# Patient Record
Sex: FEMALE | Race: WHITE
[De-identification: names, ages, dates, MRNs, and addresses within clinical notes are randomized per-mention and may not be internally consistent; named-entity substitution may affect disease eponyms.]

---

## 2020-11-26 NOTE — EDM.PDOC
ED HPI GENERAL MEDICAL PROBLEM





- General


Chief Complaint: Chest Pain


Stated Complaint: CHEST TIGHTNESS


Time Seen by Provider: 11/26/20 15:53


Source of Information: Reports: Patient, RN Notes Reviewed





- History of Present Illness


INITIAL COMMENTS - FREE TEXT/NARRATIVE: 





49 yr old female with onset of central and L central chest discomfort about 3 

hrs ago.  She did take aspirin.  Still has mild to moderate chest ache and 

heaviness at time of eval. Pain does not radiate.  No abd pain, nausea or 

vomiting.  No recent cough, fever or chills.  She does not smoke. Denies hx Htn,

diabetes or other known health problems.  


  ** Anterior Chest


Pain Score (Numeric/FACES): 2





- Related Data


                                    Allergies











Allergy/AdvReac Type Severity Reaction Status Date / Time


 


Penicillins Allergy Severe Hives Verified 11/26/20 15:42











Home Meds: 


                                    Home Meds





. [No Known Home Meds]  11/26/20 [History]











Past Medical History


HEENT History: Reports: Other (See Below)


Other HEENT History: septoplasty


Musculoskeletal History: Reports: Other (See Below)


Other Musculoskeletal History: radial heads shattered bilat and had surgery


Psychiatric History: Reports: Anxiety





Social & Family History





- Tobacco Use


Tobacco Use Status *Q: Never Tobacco User





- Caffeine Use


Caffeine Use: Reports: Coffee, Soda, Tea





- Recreational Drug Use


Recreational Drug Use: No





ED ROS GENERAL





- Review of Systems


Review Of Systems: See Below


Constitutional: Denies: Fever, Chills, Diaphoresis


HEENT: Reports: No Symptoms


Respiratory: Denies: Shortness of Breath, Pleuritic Chest Pain, Cough


Cardiovascular: Reports: Chest Pain.  Denies: Palpitations


GI/Abdominal: Denies: Abdominal Pain, Nausea, Vomiting


Musculoskeletal: Denies: Shoulder Pain, Arm Pain, Back Pain


Skin: Reports: No Symptoms


Neurological: Reports: No Symptoms





ED EXAM, GENERAL





- Physical Exam


Exam: See Below


General Appearance: Alert, Anxious


Head: Atraumatic


Neck: Supple


Respiratory/Chest: No Respiratory Distress, Lungs Clear, Normal Breath Sounds, 

Chest Non-Tender


Cardiovascular: Regular Rate, Rhythm


GI/Abdominal: Soft, Non-Tender


Extremities: Normal Inspection.  No: Leg Pain, Increased Warmth, Redness


Neurological: Alert, Oriented, No Motor/Sensory Deficits


Skin Exam: Warm, Dry, Normal Color, No Rash


  ** #1 Interpretation


EKG Date: 11/26/20


Rhythm: NSR


Axis: Normal


P-Wave: Present


QRS: Other (q waves lead III)


ST-T: Other (T wave inversion lead III.)





Course





- Vital Signs


Last Recorded V/S: 


                                Last Vital Signs











Temp  97.7 F   11/26/20 15:45


 


Pulse  106 H  11/26/20 15:45


 


Resp  20   11/26/20 15:45


 


BP  153/107 H  11/26/20 15:45


 


Pulse Ox  96   11/26/20 15:45














- Orders/Labs/Meds


Labs: 


                                Laboratory Tests











  11/26/20 Range/Units





  16:40 


 


Troponin I  < 0.017  (0.00-0.056)  ng/mL














- Re-Assessments/Exams


Free Text/Narrative Re-Assessment/Exam: 





11/26/20 18:36


EKG did not show acute changes, trop nl.  Sinus rythm while here in the ED, no 

ectopy.  Discharge instr. as documented.  





Departure





- Departure


Time of Disposition: 17:35


Disposition: Home, Self-Care 01


Condition: Fair


Clinical Impression: 


 Atypical chest pain





Instructions:  Nonspecific Chest Pain, Adult


Referrals: 


My Barney MD [Primary Care Provider] - 


Forms:  ED Department Discharge


Additional Instructions: 


Rest.  You may alternate tylenol and ibuprofen as needed for discomfort.  Have 

your blood pressure checked a few times in the next 1 to 2 weeks to make sure  

that does normalize.  Follow up with your regular medical provider as needed.  

Return to ED if symptoms worsening in any way.  





Sepsis Event Note (ED)





- Evaluation


Sepsis Screening Result: No Definite Risk





- Focused Exam


Vital Signs: 


                                   Vital Signs











  Temp Pulse Resp BP Pulse Ox


 


 11/26/20 15:45  97.7 F  106 H  20  153/107 H  96

## 2023-01-24 ENCOUNTER — HOSPITAL ENCOUNTER (EMERGENCY)
Dept: HOSPITAL 41 - JD.ED | Age: 53
Discharge: HOME | End: 2023-01-24
Payer: COMMERCIAL

## 2023-01-24 DIAGNOSIS — R59.0: ICD-10-CM

## 2023-01-24 DIAGNOSIS — M54.50: Primary | ICD-10-CM

## 2023-01-24 DIAGNOSIS — Z91.048: ICD-10-CM

## 2023-01-24 DIAGNOSIS — Z88.0: ICD-10-CM

## 2023-01-24 DIAGNOSIS — Z20.822: ICD-10-CM

## 2023-01-24 DIAGNOSIS — Z91.040: ICD-10-CM

## 2023-01-24 LAB
EGFRCR SERPLBLD CKD-EPI 2021: 90 ML/MIN (ref 60–?)
SARS-COV-2 RNA RESP QL NAA+PROBE: NEGATIVE

## 2023-01-24 PROCEDURE — 80053 COMPREHEN METABOLIC PANEL: CPT

## 2023-01-24 PROCEDURE — 86140 C-REACTIVE PROTEIN: CPT

## 2023-01-24 PROCEDURE — 99284 EMERGENCY DEPT VISIT MOD MDM: CPT

## 2023-01-24 PROCEDURE — 81001 URINALYSIS AUTO W/SCOPE: CPT

## 2023-01-24 PROCEDURE — 85025 COMPLETE CBC W/AUTO DIFF WBC: CPT

## 2023-01-24 PROCEDURE — 36415 COLL VENOUS BLD VENIPUNCTURE: CPT

## 2023-01-24 PROCEDURE — 74176 CT ABD & PELVIS W/O CONTRAST: CPT

## 2023-01-24 PROCEDURE — 0241U: CPT
